# Patient Record
Sex: MALE | Race: WHITE
[De-identification: names, ages, dates, MRNs, and addresses within clinical notes are randomized per-mention and may not be internally consistent; named-entity substitution may affect disease eponyms.]

---

## 2017-08-01 ENCOUNTER — HOSPITAL ENCOUNTER (INPATIENT)
Dept: HOSPITAL 80 - F3N | Age: 62
LOS: 3 days | Discharge: HOME HEALTH | DRG: 460 | End: 2017-08-04
Attending: NEUROLOGICAL SURGERY | Admitting: NEUROLOGICAL SURGERY
Payer: COMMERCIAL

## 2017-08-01 DIAGNOSIS — M51.26: ICD-10-CM

## 2017-08-01 DIAGNOSIS — M71.38: ICD-10-CM

## 2017-08-01 DIAGNOSIS — M48.06: Primary | ICD-10-CM

## 2017-08-01 DIAGNOSIS — F41.8: ICD-10-CM

## 2017-08-01 LAB
ANION GAP SERPL CALC-SCNC: 9 MEQ/L (ref 8–16)
CALCIUM SERPL-MCNC: 9.2 MG/DL (ref 8.5–10.4)
CHLORIDE SERPL-SCNC: 106 MEQ/L (ref 97–110)
CO2 SERPL-SCNC: 25 MEQ/L (ref 22–31)
CREAT SERPL-MCNC: 0.8 MG/DL (ref 0.7–1.3)
GFR SERPL CREATININE-BSD FRML MDRD: > 60 ML/MIN/{1.73_M2}
GLUCOSE SERPL-MCNC: 153 MG/DL (ref 70–100)
POTASSIUM SERPL-SCNC: 4.3 MEQ/L (ref 3.5–5.2)
SODIUM SERPL-SCNC: 140 MEQ/L (ref 134–144)

## 2017-08-01 PROCEDURE — 0S900ZX DRAINAGE OF LUMBAR VERTEBRAL JOINT, OPEN APPROACH, DIAGNOSTIC: ICD-10-PCS | Performed by: NEUROLOGICAL SURGERY

## 2017-08-01 PROCEDURE — 8E0WXBZ COMPUTER ASSISTED PROCEDURE OF TRUNK REGION: ICD-10-PCS | Performed by: NEUROLOGICAL SURGERY

## 2017-08-01 PROCEDURE — 0SN00ZZ RELEASE LUMBAR VERTEBRAL JOINT, OPEN APPROACH: ICD-10-PCS | Performed by: NEUROLOGICAL SURGERY

## 2017-08-01 PROCEDURE — 0SB20ZZ EXCISION OF LUMBAR VERTEBRAL DISC, OPEN APPROACH: ICD-10-PCS | Performed by: NEUROLOGICAL SURGERY

## 2017-08-01 PROCEDURE — C1713 ANCHOR/SCREW BN/BN,TIS/BN: HCPCS

## 2017-08-01 PROCEDURE — 4A10X4G MONITORING OF CENTRAL NERVOUS ELECTRICAL ACTIVITY, INTRAOPERATIVE, EXTERNAL APPROACH: ICD-10-PCS | Performed by: NEUROLOGICAL SURGERY

## 2017-08-01 PROCEDURE — 0SG10A0 FUSION OF 2 OR MORE LUMBAR VERTEBRAL JOINTS WITH INTERBODY FUSION DEVICE, ANTERIOR APPROACH, ANTERIOR COLUMN, OPEN APPROACH: ICD-10-PCS | Performed by: NEUROLOGICAL SURGERY

## 2017-08-01 RX ADMIN — Medication ONE MG: at 15:17

## 2017-08-01 RX ADMIN — POLYETHYLENE GLYCOL 3350 SCH: 17 POWDER, FOR SOLUTION ORAL at 21:40

## 2017-08-01 RX ADMIN — FLUMAZENIL SCH MG: 0.1 INJECTION, SOLUTION INTRAVENOUS at 17:02

## 2017-08-01 RX ADMIN — ACETAMINOPHEN SCH MG: 500 TABLET ORAL at 21:31

## 2017-08-01 RX ADMIN — DOCUSATE SODIUM AND SENNOSIDES SCH TAB: 50; 8.6 TABLET ORAL at 21:35

## 2017-08-01 RX ADMIN — TRANEXAMIC ACID ONE MLS: 100 INJECTION, SOLUTION INTRAVENOUS at 09:31

## 2017-08-01 RX ADMIN — GABAPENTIN SCH MG: 100 CAPSULE ORAL at 21:30

## 2017-08-01 RX ADMIN — FAMOTIDINE SCH MG: 20 TABLET, FILM COATED ORAL at 21:30

## 2017-08-01 RX ADMIN — GABAPENTIN SCH MG: 100 CAPSULE ORAL at 23:14

## 2017-08-01 RX ADMIN — TRANEXAMIC ACID ONE MLS: 100 INJECTION, SOLUTION INTRAVENOUS at 11:53

## 2017-08-01 RX ADMIN — FLUMAZENIL SCH: 0.1 INJECTION, SOLUTION INTRAVENOUS at 17:51

## 2017-08-01 NOTE — POSTOPPROG
Post Op Note


Date of Operation: 08/01/17


Surgeon: Marlys Dye


Assistant: LOY Dye PA-C


Anesthesiologist: Claritza


Anesthesia: GET(General Endotracheal)


Pre-op Diagnosis: lumbar stenosis, radiculopathy


Post-op Diagnosis: same


Indication: numbness, nerve compression


Procedure: redo L34 decompression, L3-5laminectomy, L3-5 TLIF/PSF, synovial 

cyst resec


Findings: Please see operative report


Inf/Abcess present in the surg proc area at time of surgery?: No


Depth: Organ Space


EBL: 100-500


Complications: 





none


Drains: Michael Flores


Specimen(s): 





synovial cyst





PA Addendum





- Addendum


.: 





S: Pt sleeping in PACU





O:


Sleeping but awakens easily


Follows all commands


NAD


VSS


MAEx4


Motor 5/5 BUE/BLE


+LT


Incision dressed cdi


Jpx1


Miguel in





A: 63 yo M s/p L34 redo decompression and synovial cyst resection, L3-5 

laminectomy, L3-5 TLIF/posterior fusion





P:


PT/OT


Brace when OOB


TEDs, SCDs, lovenox POD#1


Pain management


Post op xrays pending


DC miguel in AM


Follow KIRSTY output


Call NS with any issues


D/w Dr Swanson

## 2017-08-01 NOTE — PDANEPAE
ANE History of Present Illness





62 year old male with legs, back, feet pain bilaterally.





ANE Past Medical History


Past Medical History: 





No URI/fever x2 weeks.


One kidney not working.





- Cardiovascular History


Hx Arrhythmias: Yes


Cardiovascular History Comment: Palpitations - anxiety-related?





- Pulmonary History


Hx Asthma/Reactive Airway Disease: Yes


Hx Sleep Apnea: No


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 

palsy.  RAD diagnosis.





- Renal History


Renal History Comment: one kidney not working





ANE Review of Systems


Review of Systems: 


No URI/fever x2 weeks.





- Systems


Constitutional: Reports: no symptoms


Cardiac: Reports: no symptoms, palpitations


Respiratory: Reports: no symptoms





ANE Patient History





- Allergies


Allergies/Adverse Reactions: 








cephalexin [From Keflex] Allergy (Unverified 07/31/17 13:29)


 Rash


ciprofloxacin [From Cipro] Allergy (Unverified 07/31/17 13:29)


 Rash


sulfamethoxazole [From Bactrim] Allergy (Unverified 07/31/17 13:29)


 Rash


trimethoprim [From Bactrim] Allergy (Unverified 07/31/17 13:29)


 Rash








- Home Medications


Home medications: home medication list seen and reviewed


Home Medications: 








Gabapentin [Neurontin 100 MG (*)] 200 mg PO HS 07/31/17 [Last Taken 08/01/17 01:

00]


QUEtiapine FUMARATE [Seroquel 25 mg (*)] 50 mg PO HS 07/31/17 [Last Taken 07/31/ 17 23:00 50mg]


clonazePAM [Klonopin (*)] 0.5 mg PO PRN PRN 07/31/17 [Last Taken 07/26/17]








- NPO status


NPO Since - Liquids (Date): 07/31/17


NPO Since - Liquids (Time): 22:00


NPO Since - Solids (Date): 07/31/17


NPO Since - Solids (Time): 21:00





- Anes Hx


Anes Hx: no prior problems





- Smoking Hx


Smoking Status: Never smoked


Marijuana use: No





- Alcohol Use


Alcohol Use: Occasionally (5 drinks/week)





- Family Anes Hx


Family Anes Hx: neg - N/A





ANE Labs/Vital Signs





- Labs - CBC


WBC: reviewed, no anemia





- Labs - BMP


Sodium: reviewed, normal Cr





- Vital Signs


Blood Pressure: 140/93


Heart Rate: 55


Respiratory Rate: 16


O2 Sat (%): 96


Height: 185.42 cm


Weight: 89.358 kg





ANE Physical Exam





- Airway


Neck exam: FROM


Mallampati Score: Class 2


Mouth exam: normal dental/mouth exam





- Pulmonary


Pulmonary: clear to auscultation





- Cardiovascular


Cardiovascular: regular rate and rhythym





- ASA Status


ASA Status: II





ANE Anesthesia Plan


Anesthesia Plan: general endotracheal anesthesia


Lines/Monitors: additional IV

## 2017-08-01 NOTE — POSTANESTH
Post Anesthetic Evaluation


Cardiovascular Status: Other, See Comment


Respiratory Status: Requires Airway Assist


Level of Consciousness/Mental Status: Unconscious


Pain Control: Adequate, Prn Tx Ordered


Nausea/Vomiting Control: Adequate, Prn Tx Ordered (Pt very somnolent but with 

good respiratory pattern, though requiring jaw thrust to maintain airway. Pt 

appears over sedated from the diazepam. Flumazenil 100 mcg given with good 

response. Pt maintaining own airway, moving extremities to command and 

answering questions, though still going back to sleep when left undisturbed.)

## 2017-08-02 RX ADMIN — METHOCARBAMOL PRN MG: 750 TABLET ORAL at 23:49

## 2017-08-02 RX ADMIN — DOCUSATE SODIUM AND SENNOSIDES SCH TAB: 50; 8.6 TABLET ORAL at 21:30

## 2017-08-02 RX ADMIN — OXYCODONE HYDROCHLORIDE PRN MG: 15 TABLET ORAL at 23:47

## 2017-08-02 RX ADMIN — ACETAMINOPHEN SCH MG: 500 TABLET ORAL at 21:29

## 2017-08-02 RX ADMIN — FAMOTIDINE SCH MG: 20 TABLET, FILM COATED ORAL at 21:31

## 2017-08-02 RX ADMIN — POLYETHYLENE GLYCOL 3350 SCH: 17 POWDER, FOR SOLUTION ORAL at 17:08

## 2017-08-02 RX ADMIN — POLYETHYLENE GLYCOL 3350 SCH GM: 17 POWDER, FOR SOLUTION ORAL at 08:55

## 2017-08-02 RX ADMIN — OXYCODONE HYDROCHLORIDE PRN MG: 15 TABLET ORAL at 10:47

## 2017-08-02 RX ADMIN — METHOCARBAMOL PRN MG: 750 TABLET ORAL at 16:49

## 2017-08-02 RX ADMIN — OXYCODONE HYDROCHLORIDE PRN MG: 15 TABLET ORAL at 05:31

## 2017-08-02 RX ADMIN — ACETAMINOPHEN SCH MG: 500 TABLET ORAL at 05:31

## 2017-08-02 RX ADMIN — ACETAMINOPHEN SCH MG: 500 TABLET ORAL at 14:00

## 2017-08-02 RX ADMIN — ENOXAPARIN SODIUM SCH MG: 100 INJECTION SUBCUTANEOUS at 17:33

## 2017-08-02 RX ADMIN — FAMOTIDINE SCH MG: 20 TABLET, FILM COATED ORAL at 08:55

## 2017-08-02 RX ADMIN — POLYETHYLENE GLYCOL 3350 SCH GM: 17 POWDER, FOR SOLUTION ORAL at 21:32

## 2017-08-02 RX ADMIN — GABAPENTIN SCH MG: 100 CAPSULE ORAL at 21:27

## 2017-08-02 RX ADMIN — DOCUSATE SODIUM AND SENNOSIDES SCH TAB: 50; 8.6 TABLET ORAL at 08:55

## 2017-08-02 NOTE — NEUSURGPN
Date of Surgery: 08/01/17


Post Op Day: 1


Assessment/Plan: 





63 yo male s/p TLIF L3-L5





- neuro stable


- pain control


- PT/OT


- wear brace when out of bed


- continue KIRSTY drain, likely remove tomorrow


- postop x-rays pending


- DVT prophylaxis: SCDs/TEDs/Lovenox


- please contact neurosurgery with any changes in neuro status/exam


Subjective: 


Doing well.  Pain localized to the back.  Genital numbness unchanged


Objective: 


Awake. Alert. PERRL. EOMI


Facial expression symmetrical


Muscle strength full at 5/5


Sensation intact to LE


Catheter Insertion Date: 08/01/17





- Physician


Discussed Patient with : Kiki





Neurosurgery Physical Exam





- Vitals, I&O, Labs





 I and O











 08/01/17 08/02/17 08/03/17





 05:59 05:59 05:59


 


Intake Total  2510 500


 


Output Total  1470 1400


 


Balance  1040 -900


 


Weight  88.451 kg 


 


Intake:   


 


  Oral (ml)  10 500


 


  IV Intake (ml)  1500 


 


  IV Infused (ml)  1000 


 


    NS W/ 20 KCl/L 1,000 ml @  750 





    75 mls/hr IV CONT [facility] Rx   





    #:[id number]   


 


    Vancomycin 1.5 gm In D5w  250 





    250 ml @ 166.667 mls/hr   





    IV ONCE ONE Rx#:   





    [id number]   


 


Output:   


 


  Urine (ml)  1125 1400


 


    Catheter  1125 1400


 


  Estimated Blood Loss (ml)  250 


 


  KIRSTY Drain Output (ml)  95 


 


    Posterior Back Michael  95 





    Flores   


 


Other:   


 


  Intake Quantity   Yes





  Sufficient   


 


  Number of Voids   


 


    Catheter   1








 Vital Signs











Temp Pulse Resp BP Pulse Ox


 


 36.8 C   60   14   121/71 H  99 


 


 08/02/17 08:00  08/02/17 08:00  08/02/17 08:00  08/02/17 08:00  08/02/17 08:00








 Laboratory Results





 08/01/17 20:08 











ICD10 Worksheet


Patient Problems: 


 Problems











Problem Status Onset


 


Lumbar spondylosis Acute  














- ICD10 Problem Qualifiers


(1) Lumbar spondylosis

## 2017-08-02 NOTE — GOP
[f rep st]



                                                                OPERATIVE REPORT





DATE OF OPERATION:  08/01/2017



SURGEON:  Harjit Swanson MD



ASSISTANT:  Marlys Dye PA-C.



ANESTHESIA:  General.



PREOPERATIVE DIAGNOSIS:  

1.  Spinal stenosis, L3-L4, status post prior hemilaminotomy and decompression.

2.  L4-5 moderate-to-severe stenosis.

3.  Low back pain and radiculopathy.

4.  Progressive weakness.

5.  Perigenital numbness.



POSTOPERATIVE DIAGNOSIS:  

1.  Spinal stenosis, L3-L4, status post prior hemilaminotomy and decompression.

2.  L4-5 moderate-to-severe stenosis.

3.  Low back pain and radiculopathy.

4.  Progressive weakness.

5.  Perigenital numbness.



PROCEDURE PERFORMED:  

1.  __________ intervention surgery is a posterior arthrodesis with approach to L3, L4, L5.

2.  Posterolateral fusion with bilateral pedicle screw placements into L3, L4, and L5, from the [employer] Solera 4.75 system.

3.  Use of intraoperative 3D Stealth navigation.

4.  Redo decompressive laminectomy, L3-L4, with left-sided synovial cyst resection and spinal cord d
ecompression.

5.  L4-5 laminectomy and bilateral medial facetectomy.

6.  Left-sided L3-L4 transforaminal lumbar interbody fusion with a 9 x 28 mm titanium PEEK elevated 
cage with a morselized autograft and allograft.

7.  Left-sided L4-L5 transforaminal lumbar interbody fusion with a 9 x 28 mm titanium PEEK elevated 
cage with a morselized autograft and allograft.

8.  Posterolateral fusion on the right between L3 and L5 with morselized autograft and allograft.

9.  Use of intraoperative fluoroscopy, less than 1 hour physician time.

10.  Use of neuromonitoring.

11.  Use of operative microscope.

12.  Injection of preservative-free intrathecal narcotics.



FINDINGS:  



SPECIMENS:  A synovial cyst was sent to Pathology for permanent analysis.



ESTIMATED BLOOD LOSS:  250 mL.



INDICATIONS:  The patient is a 62-year-old gentleman, who has undergone prior hemilaminotomy and dec
ompression by myself several months ago.  The patient did well for some time, but then presented wit
h worsening symptoms including some weakness.  He had evidence of progressive and recurrent stenosis
 at L3-4 with development of a new synovial cyst causing his symptoms.  The patient failed nonoperat
deirdre intervention and given his progression of symptoms including weakness, we decided to proceed for
th with surgery as described above.



DESCRIPTION OF PROCEDURE:  Patient was brought to the operating theater and underwent general endotr
acheal anesthesia without complications.  Venodynes, BASIM hose, and the appropriate lines were placed
 by Anesthesia.  He was then flipped prone onto the Michael table and all bony processes were inspec
basim and padded.  The lower lumbar region including the prior incision was identified, and prepped an
d draped in the usual sterile surgical fashion.  A time-out was completed per protocol, and the bonnie
ent received antibiotics within 1 hour of incision. 



The incision was infiltrated with Marcaine with epinephrine and taken down with the scalpel blade.  
Care was taken to avoid the previous hemilaminotomy defect at the L3-4 level.  We created a subperio
steal dissection up to the transverse processes bilaterally at L3, L4, and L5.  Deep retractors were
 placed to maintain our exposure.  We attached the 3D Stealth navigation clamp to the spinous proces
s of L5 and completed a 3D Stealth navigation spin.  Using 3D Stealth navigation, we placed the alissa
t holes for the bilateral pedicle screws at L3, L4, and L5.  All holes were manually palpated with n
o evidence of any cortical breaches.  We then tapped and placed 6.5 x 55 mm screws bilaterally at L3
, L4, and 6.5 x 50 mm screws bilaterally at L5.  Another 3D Stealth navigation spin demonstrated goo
d placement of the hardware.  At this point, the microscope was brought into the field to assist wit
h microscopic dissection and to maintain illumination and magnification.  We moved out and completed
 a redo laminectomy and decompression at L3-L4 and -L4-L5.  The tissues were extremely adherent and 
scarred down at the L3-4 level.  On the left side, we moved the thecal sac medially, and we were abl
e to tease out very adherent synovial cyst. which was removed and sent to Pathology for permanent an
alysis.  Once we completed our lateral recess decompressions, we resected the pars on the left side 
between L3-L4 and L4-L5.  We moved up to L3-4, where we distracted the interspace and completed a le
ft-sided L3-4 diskectomy.  We prepared the concept plates and measured the interbody space.  Then, anuj villanueva placed a 9 x 28 mm titanium PEEK elevated cage with morselized autograft and allograft anteriorly 
towards the midline.  We let down distraction and moved down to L4-5, where we distracted the inters
pace and completed a left-sided L4-5 diskectomy.  We prepared the concept plates and measured the in
terbody space.  We then placed a 9 x 28 mm titanium PEEK elevated cage with morselized autograft and
 allograft anteriorly toward the midline.  We packed additional morcellized autograft in the disk sp
ace for interbody fusion.  We then let down distraction and decorticated the bone on the right side 
between L3 and L5.  We placed 2 lordotic rods into the heads of the screws between L3 and L5, and se
cured them down with cap screws, which were then tightened to the 's setting.  We irriga
basim copiously with bacitracin irrigation and placed morselized autograft and allograft on the right 
side between L3-5 for posterolateral fusion.  We injected preservative-free intrathecal narcotics an
d left the drain in the subfascial space.  The wound was then closed in multiple layers using Vicryl
 sutures in the deep layers and Dermabond for the skin.  The patient's wounds were dressed sterilely
.  He was then flipped supine onto the transfer cart, where he was awakened, extubated, and taken to
 the recovery room in stable condition. 



There were no complications and no noted changes on neuromonitoring throughout the procedure.



COMPLICATIONS:  None.





Job #:  [id number]

## 2017-08-03 RX ADMIN — GABAPENTIN SCH MG: 100 CAPSULE ORAL at 20:59

## 2017-08-03 RX ADMIN — POLYETHYLENE GLYCOL 3350 SCH GM: 17 POWDER, FOR SOLUTION ORAL at 09:09

## 2017-08-03 RX ADMIN — OXYCODONE HYDROCHLORIDE PRN MG: 15 TABLET ORAL at 10:28

## 2017-08-03 RX ADMIN — POLYETHYLENE GLYCOL 3350 SCH GM: 17 POWDER, FOR SOLUTION ORAL at 14:56

## 2017-08-03 RX ADMIN — ACETAMINOPHEN SCH MG: 500 TABLET ORAL at 14:55

## 2017-08-03 RX ADMIN — ENOXAPARIN SODIUM SCH MG: 100 INJECTION SUBCUTANEOUS at 08:41

## 2017-08-03 RX ADMIN — METHOCARBAMOL PRN MG: 750 TABLET ORAL at 17:24

## 2017-08-03 RX ADMIN — ACETAMINOPHEN SCH MG: 500 TABLET ORAL at 06:12

## 2017-08-03 RX ADMIN — FAMOTIDINE SCH MG: 20 TABLET, FILM COATED ORAL at 20:58

## 2017-08-03 RX ADMIN — DOCUSATE SODIUM AND SENNOSIDES SCH TAB: 50; 8.6 TABLET ORAL at 08:40

## 2017-08-03 RX ADMIN — ACETAMINOPHEN SCH MG: 500 TABLET ORAL at 20:57

## 2017-08-03 RX ADMIN — FAMOTIDINE SCH MG: 20 TABLET, FILM COATED ORAL at 08:40

## 2017-08-03 RX ADMIN — DOCUSATE SODIUM AND SENNOSIDES SCH TAB: 50; 8.6 TABLET ORAL at 21:01

## 2017-08-03 RX ADMIN — METHOCARBAMOL PRN MG: 750 TABLET ORAL at 00:13

## 2017-08-03 RX ADMIN — METHOCARBAMOL PRN MG: 750 TABLET ORAL at 10:28

## 2017-08-03 RX ADMIN — POLYETHYLENE GLYCOL 3350 SCH GM: 17 POWDER, FOR SOLUTION ORAL at 21:02

## 2017-08-03 NOTE — NEUSURGPN
Assessment/Plan: 





63 yo male s/p TLIF L3-L5





- neuro stable- needs a lot of encouragement, has a lot of anxiety, asking for 

psych consult, but has been seeing spiritual care for anxiety while he has been 

here for now. 


- pain control


- PT/OT


- wear brace when out of bed


- Remove KIRSTY drain today 


- postop x-rays still pending- get today


- DVT prophylaxis: SCDs/TEDs/Lovenox


- please contact neurosurgery with any changes in neuro status/exam


Subjective: 


Doign well, fairly anxious still and does not think he can go home today. 

Denies any leg pain, but has expected back pain that is being well manged. 


Objective: 


Awake. Alert. PERRL. EOMI


Facial expression symmetrical


Muscle strength full at 5/5


Sensation intact to LE





KIRSTY X 1- with serosang in bulb- to be removed


Catheter Insertion Date: 08/01/17





- Physician


Discussed Patient with : Kkii





Neurosurgery Physical Exam





- Vitals, I&O, Labs





 I and O











 08/02/17 08/03/17 08/04/17





 05:59 05:59 05:59


 


Intake Total 2510 1000 350


 


Output Total 1470 1915 820


 


Balance 1040 -915 -470


 


Weight 88.451 kg  


 


Intake:   


 


  Oral (ml) 10 1000 350


 


  IV Intake (ml) 1500  


 


  IV Infused (ml) 1000  


 


    NS W/ 20 KCl/L 1,000 ml @ 750  





    75 mls/hr IV CONT [facility]ACE Rx   





    #:[id number]   


 


    Vancomycin 1.5 gm In D5w 250  





    250 ml @ 166.667 mls/hr   





    IV ONCE ONE Rx#:   





    [id number]   


 


Output:   


 


  Urine (ml) 1125 1850 700


 


    Catheter 1125 1850 


 


    Urinal   700


 


  Estimated Blood Loss (ml) 250  


 


  KIRSTY Drain Output (ml) 95 65 120


 


    Posterior Back Michael 95 65 120





    Flores   


 


Other:   


 


  Intake Quantity  Yes 





  Sufficient   


 


  Number of Voids   


 


    Catheter  3 








 Vital Signs











Temp Pulse Resp BP Pulse Ox


 


 36.7 C   69   14   107/61   98 


 


 08/03/17 08:10  08/03/17 08:10  08/03/17 08:10  08/03/17 08:10  08/03/17 08:10








 Laboratory Results





 08/01/17 20:08 











ICD10 Worksheet


Patient Problems: 


 Problems











Problem Status Onset


 


Lumbar spondylosis Acute

## 2017-08-04 VITALS
SYSTOLIC BLOOD PRESSURE: 128 MMHG | OXYGEN SATURATION: 93 % | TEMPERATURE: 98.3 F | RESPIRATION RATE: 18 BRPM | HEART RATE: 89 BPM | DIASTOLIC BLOOD PRESSURE: 87 MMHG

## 2017-08-04 RX ADMIN — DOCUSATE SODIUM AND SENNOSIDES SCH TAB: 50; 8.6 TABLET ORAL at 09:19

## 2017-08-04 RX ADMIN — ACETAMINOPHEN SCH MG: 500 TABLET ORAL at 15:44

## 2017-08-04 RX ADMIN — ACETAMINOPHEN SCH MG: 500 TABLET ORAL at 06:24

## 2017-08-04 RX ADMIN — POLYETHYLENE GLYCOL 3350 SCH GM: 17 POWDER, FOR SOLUTION ORAL at 09:20

## 2017-08-04 RX ADMIN — POLYETHYLENE GLYCOL 3350 SCH: 17 POWDER, FOR SOLUTION ORAL at 15:45

## 2017-08-04 RX ADMIN — ENOXAPARIN SODIUM SCH MG: 100 INJECTION SUBCUTANEOUS at 09:19

## 2017-08-04 RX ADMIN — ACETAMINOPHEN SCH MG: 500 TABLET ORAL at 17:05

## 2017-08-04 RX ADMIN — ACETAMINOPHEN SCH: 500 TABLET ORAL at 15:46

## 2017-08-04 RX ADMIN — FAMOTIDINE SCH MG: 20 TABLET, FILM COATED ORAL at 09:19

## 2017-08-04 NOTE — NEUSURGPN
Date of Surgery: 08/01/17


Post Op Day: 3


Assessment/Plan: 





61 yo male s/p TLIF L3-L5





- neuro stable


- pain control


- PT/OT


- wear brace when out of bed


- postop x-rays- Hardware in good position


- DVT prophylaxis: SCDs/TEDs/Lovenox


- Dispo: Plan for home later today if does well with PT/OT


- please contact neurosurgery with any changes in neuro status/exam


Subjective: 


Back pain controlled.  No LE numbness, pain, tingling.  Continues to have UE 

twitches, unchanged compared to prior to surgery. 


Objective: 


Awake. Alert. PERRL. EOMI


Muscle strength full at 5/5


Sensation intact


Catheter Insertion Date: 08/01/17





- Physician


Discussed Patient with : Kiki





Neurosurgery Physical Exam





- Vitals, I&O, Labs





 I and O











 08/03/17 08/04/17 08/05/17





 05:59 05:59 05:59


 


Intake Total 1000 550 


 


Output Total 1915 2245 


 


Balance -915 -1695 


 


Intake:   


 


  Oral (ml) 1000 550 


 


Output:   


 


  Urine (ml) 1850 2100 


 


    Catheter 1850  


 


    Urinal  2100 


 


  KIRSTY Drain Output (ml) 65 145 


 


    Posterior Back Michael 65 145 





    Flores   


 


Other:   


 


  Intake Quantity Yes Yes 





  Sufficient   


 


  Number of Voids   


 


    Catheter 3  








 Vital Signs











Temp Pulse Resp BP Pulse Ox


 


 36.9 C   71   14   115/77   99 


 


 08/04/17 07:39  08/04/17 07:39  08/04/17 07:39  08/04/17 07:39  08/04/17 07:39








 Laboratory Results





 08/01/17 20:08 











ICD10 Worksheet


Patient Problems: 


 Problems











Problem Status Onset


 


Lumbar spondylosis Acute  














- ICD10 Problem Qualifiers


(1) Lumbar spondylosis

## 2020-11-18 ENCOUNTER — OFFICE VISIT (OUTPATIENT)
Dept: URBAN - METROPOLITAN AREA CLINIC 68 | Facility: CLINIC | Age: 65
End: 2020-11-18

## 2022-06-04 ENCOUNTER — TELEPHONE ENCOUNTER (OUTPATIENT)
Dept: URBAN - METROPOLITAN AREA CLINIC 68 | Facility: CLINIC | Age: 67
End: 2022-06-04

## 2022-06-05 ENCOUNTER — TELEPHONE ENCOUNTER (OUTPATIENT)
Dept: URBAN - METROPOLITAN AREA CLINIC 68 | Facility: CLINIC | Age: 67
End: 2022-06-05

## 2022-06-25 ENCOUNTER — TELEPHONE ENCOUNTER (OUTPATIENT)
Age: 67
End: 2022-06-25

## 2022-06-26 ENCOUNTER — TELEPHONE ENCOUNTER (OUTPATIENT)
Age: 67
End: 2022-06-26